# Patient Record
Sex: FEMALE | Race: WHITE | NOT HISPANIC OR LATINO | Employment: FULL TIME | URBAN - METROPOLITAN AREA
[De-identification: names, ages, dates, MRNs, and addresses within clinical notes are randomized per-mention and may not be internally consistent; named-entity substitution may affect disease eponyms.]

---

## 2022-10-25 ENCOUNTER — OFFICE VISIT (OUTPATIENT)
Dept: OBGYN CLINIC | Facility: CLINIC | Age: 47
End: 2022-10-25

## 2022-10-25 VITALS
DIASTOLIC BLOOD PRESSURE: 62 MMHG | WEIGHT: 116.8 LBS | BODY MASS INDEX: 20.7 KG/M2 | HEIGHT: 63 IN | SYSTOLIC BLOOD PRESSURE: 104 MMHG

## 2022-10-25 DIAGNOSIS — Z12.4 SCREENING FOR MALIGNANT NEOPLASM OF THE CERVIX: ICD-10-CM

## 2022-10-25 DIAGNOSIS — Z12.31 ENCOUNTER FOR SCREENING MAMMOGRAM FOR MALIGNANT NEOPLASM OF BREAST: ICD-10-CM

## 2022-10-25 DIAGNOSIS — Z01.419 ROUTINE GYNECOLOGICAL EXAMINATION: Primary | ICD-10-CM

## 2022-10-25 NOTE — PROGRESS NOTES
[id number], [address], [facility], [address] [facility]    ASSESSMENT/PLAN: Tan Wong is a 49 y o   who presents for annual gynecologic exam     Encounter for routine gynecologic examination  - Routine well woman exam completed today  - Cervical Cancer Screening: Current ASCCP Guidelines reviewed  Last Pap: 2019   Next Pap Due: today  - HPV Vaccination status: Not immunized  - Contraceptive counseling discussed  Current contraception: none  - Breast Cancer Screening: Last Mammogram Not on file, ordered  - Colorectal cancer screening was not ordered  - The following were reviewed in today's visit: breast self exam, mammography screening ordered, family planning choices, menopause and Mirena    Additional problems addressed during this visit:  1  Routine gynecological examination  Comments: May want Mirena  Will schedule    2  Encounter for screening mammogram for malignant neoplasm of breast  -     Mammo screening bilateral w 3d & cad; Future    3  Screening for malignant neoplasm of the cervix  -     Thinprep Pap and HPV mRNA E6/E7 Reflex HPV 16,18/45        CC:  Annual Gynecologic Examination    HPI: Tan Wong is a 49 y o   who presents for annual gynecologic examination  HPI    The following portions of the patient's history were reviewed and updated as appropriate: She  has no past medical history on file  She  has no past surgical history on file  Her family history is not on file  She  reports that she has never smoked  She has never used smokeless tobacco  She reports current alcohol use  She reports that she does not use drugs  No current outpatient medications on file  No current facility-administered medications for this visit  She is allergic to penicillins and ciprofloxacin       Review of Systems      Objective:  /62 (BP Location: Left arm, Patient Position: Sitting, Cuff Size: Standard)   Ht 5' 3" (1 6 m)   Wt 53 kg (116 lb 12 8 oz)   LMP 10/03/2022 (Approximate)   Breastfeeding No   BMI 20 69 kg/m²    Physical Exam      PE:  General Appearance: alert and oriented, in no acute distress  HEENT: PERRL, thyroid without masses or tenderness  Breast: No masses, tenderness, skin changes, nipple D/C or axillary or supraclavicular adenopathy  Abdomen: Soft, non-tender, non-distended, no masses, no rebound or guarding  Pelvic:       External genitalia: Normal appearance, no abnormal pigmentation, no lesions or masses  Normal Bartholin's and Somers's  Urinary system: Urethral meatus normal, bladder non-tender  Vaginal: normal mucosa without prolapse or lesions  Normal-appearing physiologic discharge      Cervix: Normal-appearing, well-epithelialized, no gross lesions or masses No cervical motion tenderness  Adnexa: No adnexal masses or tenderness noted  Uterus: Normal-sized, regular contour, midline, mobile, no uterine tenderness  Extremities: Normal range of motion     Skin: normal, no rash or abnormalities  Neurologic: alert, oriented x3  Psychiatric: Appropriate affect, mood stable, cooperative with exam

## 2022-10-30 LAB
CLINICAL INFO: NORMAL
CYTO CVX: NORMAL
DATE PREVIOUS BX: NORMAL
HPV E6+E7 MRNA CVX QL NAA+PROBE: NOT DETECTED
LMP START DATE: NORMAL
SL AMB PREV. PAP:: NORMAL
SPECIMEN SOURCE CVX/VAG CYTO: NORMAL

## 2023-01-05 DIAGNOSIS — Z12.31 ENCOUNTER FOR SCREENING MAMMOGRAM FOR MALIGNANT NEOPLASM OF BREAST: ICD-10-CM

## 2024-07-24 ENCOUNTER — HOSPITAL ENCOUNTER (OUTPATIENT)
Dept: HOSPITAL 99 - GI | Age: 49
End: 2024-07-24
Payer: COMMERCIAL

## 2024-07-24 DIAGNOSIS — Z12.11: Primary | ICD-10-CM

## 2024-07-24 DIAGNOSIS — R19.5: ICD-10-CM

## 2024-07-24 DIAGNOSIS — D12.0: ICD-10-CM

## 2024-07-24 DIAGNOSIS — D12.4: ICD-10-CM

## 2024-07-24 PROCEDURE — 45385 COLONOSCOPY W/LESION REMOVAL: CPT

## 2024-07-24 PROCEDURE — 45381 COLONOSCOPY SUBMUCOUS NJX: CPT

## 2024-07-24 PROCEDURE — 88305 TISSUE EXAM BY PATHOLOGIST: CPT

## 2024-08-07 ENCOUNTER — ANNUAL EXAM (OUTPATIENT)
Dept: OBGYN CLINIC | Facility: CLINIC | Age: 49
End: 2024-08-07
Payer: COMMERCIAL

## 2024-08-07 VITALS
DIASTOLIC BLOOD PRESSURE: 56 MMHG | SYSTOLIC BLOOD PRESSURE: 96 MMHG | BODY MASS INDEX: 21.16 KG/M2 | HEIGHT: 62 IN | WEIGHT: 115 LBS

## 2024-08-07 DIAGNOSIS — Z01.419 ROUTINE GYNECOLOGICAL EXAMINATION: Primary | ICD-10-CM

## 2024-08-07 DIAGNOSIS — Z12.31 ENCOUNTER FOR SCREENING MAMMOGRAM FOR MALIGNANT NEOPLASM OF BREAST: ICD-10-CM

## 2024-08-07 PROCEDURE — 99396 PREV VISIT EST AGE 40-64: CPT | Performed by: OBSTETRICS & GYNECOLOGY

## 2024-08-07 RX ORDER — POLYETHYLENE GLYCOL 3350, SODIUM SULFATE, POTASSIUM CHLORIDE, MAGNESIUM SULFATE, AND SODIUM CHLORIDE FOR ORAL SOLUTION 178.7-7.3G
KIT ORAL
COMMUNITY
Start: 2024-07-20

## 2024-08-07 RX ORDER — SULFAMETHOXAZOLE/TRIMETHOPRIM 800-160 MG
TABLET ORAL
COMMUNITY
Start: 2024-07-31

## 2024-08-07 RX ORDER — HYDROCORTISONE 2.5 %
1 CREAM (GRAM) TOPICAL 2 TIMES DAILY PRN
COMMUNITY
Start: 2024-07-31

## 2024-08-07 RX ORDER — VALACYCLOVIR HYDROCHLORIDE 1 G/1
1000 TABLET, FILM COATED ORAL 3 TIMES DAILY
COMMUNITY
Start: 2024-08-01

## 2024-08-07 NOTE — PROGRESS NOTES
[facility] [address], [address], [place] [address]    ASSESSMENT/PLAN: Lillie Francois is a 49 y.o.  who presents for annual gynecologic exam.    Encounter for routine gynecologic examination  - Routine well woman exam completed today.  - Cervical Cancer Screening: Current ASCCP Guidelines reviewed. Last Pap: 10/25/2022 . Next Pap Due:   - HPV Vaccination status: Not immunized  - Contraceptive counseling discussed.  Current contraception: Abstinence  - Breast Cancer Screening: Last Mammogram 2022, ordered  - Colorectal cancer screening was not ordered. She is up to date. Precancerous polyp found last year and follow surveillance was recommended.  - The following were reviewed in today's visit: breast self exam, mammography screening ordered, family planning choices, menopause and Mirena    Additional problems addressed during this visit:  1. Routine gynecological examination  2. Encounter for screening mammogram for malignant neoplasm of breast  -     Mammo screening bilateral w 3d & cad; Future      CC:  Annual Gynecologic Examination    HPI: Lillie Francois is a 49 y.o.  who presents for annual gynecologic examination.  HPI    The following portions of the patient's history were reviewed and updated as appropriate: She  has no past medical history on file.  She  has no past surgical history on file.  Her family history is not on file.  She  reports that she has never smoked. She has never used smokeless tobacco. She reports current alcohol use. She reports that she does not use drugs.  Current Outpatient Medications   Medication Sig Dispense Refill    hydrocortisone 2.5 % cream Apply 1 Application topically 2 (two) times a day as needed To affected area      Suflave 178.7 g SOLR AS DIRECTED ML ORALLY NOW FOR 1 DAY      sulfamethoxazole-trimethoprim (BACTRIM DS) 800-160 mg per tablet take 1 tablet by mouth twice a day for 7 days      valACYclovir (VALTREX) 1,000 mg tablet Take  "1,000 mg by mouth 3 (three) times a day       No current facility-administered medications for this visit.     She is allergic to penicillins and ciprofloxacin..    Review of Systems      Objective:  BP 96/56 (BP Location: Left arm, Patient Position: Sitting, Cuff Size: Standard)   Ht 5' 2\" (1.575 m)   Wt 52.2 kg (115 lb)   LMP 08/03/2024   Breastfeeding No   BMI 21.03 kg/m²    Physical Exam      PE:  General Appearance: alert and oriented, in no acute distress.   HEENT: PERRL, thyroid without masses or tenderness  Breast: No masses, tenderness, skin changes, nipple D/C or axillary or supraclavicular adenopathy  Abdomen: Soft, non-tender, non-distended, no masses, no rebound or guarding.  Pelvic:       External genitalia: Normal appearance, no abnormal pigmentation, no lesions or masses. Normal Bartholin's and Oretta's.      Urinary system: Urethral meatus normal, bladder non-tender.      Vaginal: normal mucosa without prolapse or lesions. Normal-appearing physiologic discharge      Cervix: Normal-appearing, well-epithelialized, no gross lesions or masses No cervical motion tenderness.      Adnexa: No adnexal masses or tenderness noted.      Uterus: Normal-sized, regular contour, midline, mobile, no uterine tenderness.  Extremities: Normal range of motion.   Skin: normal, no rash or abnormalities  Neurologic: alert, oriented x3  Psychiatric: Appropriate affect, mood stable, cooperative with exam.  "

## 2025-01-23 DIAGNOSIS — Z12.31 ENCOUNTER FOR SCREENING MAMMOGRAM FOR MALIGNANT NEOPLASM OF BREAST: ICD-10-CM

## 2025-02-06 ENCOUNTER — TELEPHONE (OUTPATIENT)
Age: 50
End: 2025-02-06

## 2025-07-25 ENCOUNTER — HOSPITAL ENCOUNTER (OUTPATIENT)
Dept: HOSPITAL 99 - GI | Age: 50
Discharge: HOME | End: 2025-07-25
Payer: COMMERCIAL

## 2025-07-25 DIAGNOSIS — Z12.11: Primary | ICD-10-CM

## 2025-07-25 DIAGNOSIS — Z86.0101: ICD-10-CM

## 2025-07-25 DIAGNOSIS — K63.5: ICD-10-CM

## 2025-07-25 PROCEDURE — 45385 COLONOSCOPY W/LESION REMOVAL: CPT
